# Patient Record
Sex: MALE | Race: OTHER | Employment: FULL TIME | URBAN - METROPOLITAN AREA
[De-identification: names, ages, dates, MRNs, and addresses within clinical notes are randomized per-mention and may not be internally consistent; named-entity substitution may affect disease eponyms.]

---

## 2018-10-29 ENCOUNTER — HOSPITAL ENCOUNTER (EMERGENCY)
Age: 38
Discharge: HOME | End: 2018-10-30
Attending: STUDENT IN AN ORGANIZED HEALTH CARE EDUCATION/TRAINING PROGRAM
Payer: SELF-PAY

## 2018-10-29 DIAGNOSIS — E86.0 DEHYDRATION: ICD-10-CM

## 2018-10-29 DIAGNOSIS — R07.9 CHEST PAIN, UNSPECIFIED TYPE: Primary | ICD-10-CM

## 2018-10-29 LAB
BASOPHILS # BLD: 0 K/UL (ref 0–0.1)
BASOPHILS NFR BLD: 1 % (ref 0–1)
COMMENT, HOLDF: NORMAL
DIFFERENTIAL METHOD BLD: ABNORMAL
EOSINOPHIL # BLD: 0.5 K/UL (ref 0–0.4)
EOSINOPHIL NFR BLD: 7 % (ref 0–7)
ERYTHROCYTE [DISTWIDTH] IN BLOOD BY AUTOMATED COUNT: 12.2 % (ref 11.5–14.5)
HCT VFR BLD AUTO: 39 % (ref 36.6–50.3)
HGB BLD-MCNC: 13.1 G/DL (ref 12.1–17)
IMM GRANULOCYTES # BLD: 0.1 K/UL (ref 0–0.04)
IMM GRANULOCYTES NFR BLD AUTO: 1 % (ref 0–0.5)
LYMPHOCYTES # BLD: 2.2 K/UL (ref 0.8–3.5)
LYMPHOCYTES NFR BLD: 30 % (ref 12–49)
MCH RBC QN AUTO: 32.3 PG (ref 26–34)
MCHC RBC AUTO-ENTMCNC: 33.6 G/DL (ref 30–36.5)
MCV RBC AUTO: 96.3 FL (ref 80–99)
MONOCYTES # BLD: 0.7 K/UL (ref 0–1)
MONOCYTES NFR BLD: 9 % (ref 5–13)
NEUTS SEG # BLD: 3.8 K/UL (ref 1.8–8)
NEUTS SEG NFR BLD: 53 % (ref 32–75)
NRBC # BLD: 0 K/UL (ref 0–0.01)
NRBC BLD-RTO: 0 PER 100 WBC
PLATELET # BLD AUTO: 230 K/UL (ref 150–400)
PMV BLD AUTO: 10.3 FL (ref 8.9–12.9)
RBC # BLD AUTO: 4.05 M/UL (ref 4.1–5.7)
SAMPLES BEING HELD,HOLD: NORMAL
WBC # BLD AUTO: 7.2 K/UL (ref 4.1–11.1)

## 2018-10-29 PROCEDURE — 36415 COLL VENOUS BLD VENIPUNCTURE: CPT | Performed by: STUDENT IN AN ORGANIZED HEALTH CARE EDUCATION/TRAINING PROGRAM

## 2018-10-29 PROCEDURE — 84484 ASSAY OF TROPONIN QUANT: CPT | Performed by: STUDENT IN AN ORGANIZED HEALTH CARE EDUCATION/TRAINING PROGRAM

## 2018-10-29 PROCEDURE — 93005 ELECTROCARDIOGRAM TRACING: CPT

## 2018-10-29 PROCEDURE — 85025 COMPLETE CBC W/AUTO DIFF WBC: CPT | Performed by: STUDENT IN AN ORGANIZED HEALTH CARE EDUCATION/TRAINING PROGRAM

## 2018-10-29 PROCEDURE — 80048 BASIC METABOLIC PNL TOTAL CA: CPT | Performed by: STUDENT IN AN ORGANIZED HEALTH CARE EDUCATION/TRAINING PROGRAM

## 2018-10-29 PROCEDURE — 99284 EMERGENCY DEPT VISIT MOD MDM: CPT

## 2018-10-30 ENCOUNTER — APPOINTMENT (OUTPATIENT)
Dept: GENERAL RADIOLOGY | Age: 38
End: 2018-10-30
Attending: STUDENT IN AN ORGANIZED HEALTH CARE EDUCATION/TRAINING PROGRAM
Payer: SELF-PAY

## 2018-10-30 VITALS
TEMPERATURE: 98.1 F | HEART RATE: 56 BPM | RESPIRATION RATE: 15 BRPM | DIASTOLIC BLOOD PRESSURE: 86 MMHG | SYSTOLIC BLOOD PRESSURE: 134 MMHG | OXYGEN SATURATION: 96 %

## 2018-10-30 LAB
ANION GAP SERPL CALC-SCNC: 6 MMOL/L (ref 5–15)
ATRIAL RATE: 61 BPM
BUN SERPL-MCNC: 24 MG/DL (ref 6–20)
BUN/CREAT SERPL: 16 (ref 12–20)
CALCIUM SERPL-MCNC: 8.8 MG/DL (ref 8.5–10.1)
CALCULATED P AXIS, ECG09: 38 DEGREES
CALCULATED R AXIS, ECG10: 80 DEGREES
CALCULATED T AXIS, ECG11: 48 DEGREES
CHLORIDE SERPL-SCNC: 106 MMOL/L (ref 97–108)
CO2 SERPL-SCNC: 28 MMOL/L (ref 21–32)
CREAT SERPL-MCNC: 1.48 MG/DL (ref 0.7–1.3)
DIAGNOSIS, 93000: NORMAL
GLUCOSE SERPL-MCNC: 120 MG/DL (ref 65–100)
P-R INTERVAL, ECG05: 162 MS
POTASSIUM SERPL-SCNC: 4.1 MMOL/L (ref 3.5–5.1)
Q-T INTERVAL, ECG07: 428 MS
QRS DURATION, ECG06: 88 MS
QTC CALCULATION (BEZET), ECG08: 430 MS
SODIUM SERPL-SCNC: 140 MMOL/L (ref 136–145)
TROPONIN I SERPL-MCNC: <0.05 NG/ML
VENTRICULAR RATE, ECG03: 61 BPM

## 2018-10-30 PROCEDURE — 71046 X-RAY EXAM CHEST 2 VIEWS: CPT

## 2018-10-30 NOTE — ED NOTES
Patient provided with paperwork and follow up instructions. PIV discontinued. Ambulatory with steady gait out of department accompanied by spouse.

## 2018-10-30 NOTE — ED TRIAGE NOTES
Pt arrives ambulatory to ED with complaints of palpitations x 3 days. Pt denies any cardiac history.

## 2018-10-30 NOTE — DISCHARGE INSTRUCTIONS
Sincere no peito: Instruções de cuidados - [ Chest Pain: Care Instructions ]  Suas instruções de cuidados  Muitas coisas podem causar sincere no peito. Às vezes não é nada grave, e a sincere vai melhorando por conta própria em alguns iyer. Mas alguns tipos de dores no peito demandam mais exames e tratamento. Seu médico pode ter recomendado nakia visita para acompanhamento jeannette próximas 8 a 12 horas. Se você não estiver melhorando, pode ser necessário fazer mais exames e tratamento. Mesmo que tenha sido liberado pelo médico, você ainda precisa vigiar o surgimento de problemas. O médico consultou você atentamente, mas às vezes os problemas podem surgir mais tarde. Se apresentar sintomas novos, ou se seus sintomas não melhorarem, procure atendimento médico imediatamente. Se sua sincere no peito piorar ou ficar diferente, se a pressão durar mais de 5 minutos ou se você desmaiar (perder consciência), ligue para a emergência ou procure atendimento imediatamente. Nakia visita médica é apenas nakia etapa de seu tratamento. Mesmo que se sinta melhor, você ainda precisará o que o médico recomendar, jan ir a todas as consultas agendadas e sharri os medicamentos conforme jeet prescritos. Violetta Erichsen a se recuperar e prevenir problemas futuros. [place] cuidar de si mesmo em casa? · Descanse até se sentir melhor. · [place] seus medicamentos exatamente jan prescritos. Fale com seu médico se achar que está tendo algum problema com o medicamento. · Não dirija após sharri um medicamento para sincere sujeito a receita médica. Quando você deve pedir ajuda? Ligue para a emergência se:  · Você desmaiar (perda de consciência). · Você tiver muita dificuldade para respirar. · Você apresentar sintomas de um ataque cardíaco. Isso pode incluir:  ? Pressão ou sincere no peito, ou nakia sensação estranha no local.  ? Transpiração. ? Falta de ar.  ? Náusea ou vômito.   ? Sincere, pressão ou sensação estranha jeannette tasha, no pescoço, no maxilar, na parte superior da rigo, ou em um ou ambos os braços ou ombros.  ? Distração ou fraqueza repentina. ? Batimento cardíaco irregular ou acelerado. Após ligar para a emergência, o operador poderá pedir que você mastigue 1 aspirina para adultos, ou 2 a 4 de dose mais baixa. Espere a ambulância. Não tente dirigir até o hospital.  Ligue para o médico hoje mesmo se:  · Tiver alguma dificuldade em respirar. · A donell no peito piorar. · Tiver tonturas ou vertigens ou se sentir que pode desmaiar. · Você não estiver ficando melhor, conforme esperado. · Você apresentar nakia donell no peito nova ou diferente. Onde você pode aprender mais? Acesse http://www.[employer]/. Digite o A120 Na caixa de busca para saber mais sobre \"Donell no peito: Instruções de cuidados - [ Chest Pain: Care Instructions ]. \"  Na data de: 20 novembro, 2017  Versão de conteúdo: 11.8  © 7180-9301 [employer]. Instruções de cuidados adaptadas sob licença de seu profissional da saúde. Se você tem dúvidas sobre um estado clínico ou essas instruções, sempre pergunte ao seu profissional da saúde. A [employer] renúncia a toda e qualquer garantia ou responsabilidade por seu uso dessas informações.

## 2018-10-30 NOTE — ED PROVIDER NOTES
Mr. Sourav Leal is a 44 y/o male presenting to the ED for chest pain and palpitations. Pt is here with his wife who is interpreting for him (speaks [place]). Pt is otherwise healthy without no known health problems, takes no medications, doesn't smoke, use recreational drugs, drinks aprox 5 beers daily. Pt has been having intermittent palpitations for last week and mild chest discomfort that he describes as stabbing in left side of chest.  He denies any recent travel, denies hx of blood clots, recent illness, sick contacts. History reviewed. No pertinent past medical history. History reviewed. No pertinent surgical history. History reviewed. No pertinent family history. Social History Socioeconomic History  Marital status:  Spouse name: Not on file  Number of children: Not on file  Years of education: Not on file  Highest education level: Not on file Social Needs  Financial resource strain: Not on file  Food insecurity - worry: Not on file  Food insecurity - inability: Not on file  Transportation needs - medical: Not on file  Transportation needs - non-medical: Not on file Occupational History  Not on file Tobacco Use  Smoking status: Never Smoker  Smokeless tobacco: Never Used Substance and Sexual Activity  Alcohol use: Yes Comment: 4-5 beers a day  Drug use: No  
 Sexual activity: Not on file Other Topics Concern  Not on file Social History Narrative  Not on file ALLERGIES: Patient has no known allergies. Review of Systems Constitutional: Negative for chills, diaphoresis, fatigue and fever. HENT: Negative for congestion, rhinorrhea, sinus pressure, sore throat, trouble swallowing and voice change. Eyes: Negative for photophobia and visual disturbance. Respiratory: Negative for cough, chest tightness and shortness of breath. Cardiovascular: Positive for chest pain and palpitations. Negative for leg swelling. Gastrointestinal: Negative for abdominal pain, blood in stool, constipation, diarrhea, nausea and vomiting. Musculoskeletal: Negative for arthralgias, myalgias and neck pain. Neurological: Negative for dizziness, weakness, light-headedness, numbness and headaches. All other systems reviewed and are negative. Vitals:  
 10/29/18 2326 10/29/18 2329 10/30/18 0000 10/30/18 0018 BP: (!) 151/98  136/88 134/86 Pulse: 61  (!) 57 (!) 56 Resp: 16  16 15 Temp:  98 °F (36.7 °C)  98.1 °F (36.7 °C) SpO2: 98%  97% 96% Physical Exam  
Constitutional: He is oriented to person, place, and time. He appears well-developed and well-nourished. No distress. HENT:  
Head: Normocephalic and atraumatic. Nose: Nose normal.  
Mouth/Throat: Oropharynx is clear and moist. No oropharyngeal exudate. Eyes: Conjunctivae and EOM are normal. Pupils are equal, round, and reactive to light. Right eye exhibits no discharge. Left eye exhibits no discharge. No scleral icterus. Neck: Normal range of motion. Neck supple. No JVD present. No tracheal deviation present. No thyromegaly present. Cardiovascular: Normal rate, regular rhythm, normal heart sounds and intact distal pulses. Exam reveals no gallop and no friction rub. No murmur heard. Pulmonary/Chest: Effort normal and breath sounds normal. No stridor. No respiratory distress. He has no wheezes. He has no rales. He exhibits no tenderness. Abdominal: Bowel sounds are normal. He exhibits no distension and no mass. There is no tenderness. There is no rebound. Musculoskeletal: Normal range of motion. He exhibits no edema or tenderness. Lymphadenopathy:  
  He has no cervical adenopathy. Neurological: He is alert and oriented to person, place, and time. No cranial nerve deficit. Coordination normal.  
Skin: Skin is warm and dry. No rash noted. He is not diaphoretic.  No erythema. No pallor. Psychiatric: He has a normal mood and affect. His behavior is normal. Judgment and thought content normal.  
Nursing note and vitals reviewed. MDM Number of Diagnoses or Management Options Chest pain, unspecified type:  
Dehydration:  
  
Amount and/or Complexity of Data Reviewed Clinical lab tests: ordered and reviewed Tests in the radiology section of CPT®: reviewed and ordered Independent visualization of images, tracings, or specimens: yes Risk of Complications, Morbidity, and/or Mortality Presenting problems: moderate Diagnostic procedures: moderate Management options: moderate Patient Progress Patient progress: resolved Procedures 5:00 AM 
The patient has been reevaluated. The patient is ready for discharge. The patient's signs, symptoms, diagnosis, and discharge instructions have been discussed and the patient/ family has conveyed their understanding. The patient is to follow up as recommended or return to the ED should their symptoms worsen. Plan has been discussed and the patient is in agreement. LABORATORY TESTS: 
Recent Results (from the past 12 hour(s)) EKG, 12 LEAD, INITIAL Collection Time: 10/29/18 11:28 PM  
Result Value Ref Range Ventricular Rate 61 BPM  
 Atrial Rate 61 BPM  
 P-R Interval 162 ms QRS Duration 88 ms Q-T Interval 428 ms QTC Calculation (Bezet) 430 ms Calculated P Axis 38 degrees Calculated R Axis 80 degrees Calculated T Axis 48 degrees Diagnosis Normal sinus rhythm Normal ECG No previous ECGs available SAMPLES BEING HELD Collection Time: 10/29/18 11:37 PM  
Result Value Ref Range SAMPLES BEING HELD 1 LAV 1BLUE 1GREEN   
 COMMENT Add-on orders for these samples will be processed based on acceptable specimen integrity and analyte stability, which may vary by analyte. CBC WITH AUTOMATED DIFF Collection Time: 10/29/18 11:37 PM  
Result Value Ref Range WBC 7.2 4.1 - 11.1 K/uL  
 RBC 4.05 (L) 4.10 - 5.70 M/uL  
 HGB 13.1 12.1 - 17.0 g/dL HCT 39.0 36.6 - 50.3 % MCV 96.3 80.0 - 99.0 FL  
 MCH 32.3 26.0 - 34.0 PG  
 MCHC 33.6 30.0 - 36.5 g/dL  
 RDW 12.2 11.5 - 14.5 % PLATELET 116 499 - 010 K/uL MPV 10.3 8.9 - 12.9 FL  
 NRBC 0.0 0  WBC ABSOLUTE NRBC 0.00 0.00 - 0.01 K/uL NEUTROPHILS 53 32 - 75 % LYMPHOCYTES 30 12 - 49 % MONOCYTES 9 5 - 13 % EOSINOPHILS 7 0 - 7 % BASOPHILS 1 0 - 1 % IMMATURE GRANULOCYTES 1 (H) 0.0 - 0.5 % ABS. NEUTROPHILS 3.8 1.8 - 8.0 K/UL  
 ABS. LYMPHOCYTES 2.2 0.8 - 3.5 K/UL  
 ABS. MONOCYTES 0.7 0.0 - 1.0 K/UL  
 ABS. EOSINOPHILS 0.5 (H) 0.0 - 0.4 K/UL  
 ABS. BASOPHILS 0.0 0.0 - 0.1 K/UL  
 ABS. IMM. GRANS. 0.1 (H) 0.00 - 0.04 K/UL  
 DF AUTOMATED METABOLIC PANEL, BASIC Collection Time: 10/29/18 11:37 PM  
Result Value Ref Range Sodium 140 136 - 145 mmol/L Potassium 4.1 3.5 - 5.1 mmol/L Chloride 106 97 - 108 mmol/L  
 CO2 28 21 - 32 mmol/L Anion gap 6 5 - 15 mmol/L Glucose 120 (H) 65 - 100 mg/dL BUN 24 (H) 6 - 20 MG/DL Creatinine 1.48 (H) 0.70 - 1.30 MG/DL  
 BUN/Creatinine ratio 16 12 - 20 GFR est AA >60 >60 ml/min/1.73m2 GFR est non-AA 53 (L) >60 ml/min/1.73m2 Calcium 8.8 8.5 - 10.1 MG/DL  
TROPONIN I Collection Time: 10/29/18 11:37 PM  
Result Value Ref Range Troponin-I, Qt. <0.05 <0.05 ng/mL IMAGING RESULTS: 
XR CHEST PA LAT Final Result Xr Chest Pa Lat Result Date: 10/30/2018 History: Evaluate for chest pain. Frontal and lateral views of the chest show clear lungs. The heart, mediastinum and pulmonary vasculature are normal.  The bony thorax is unremarkable. IMPRESSION: NORMAL CHEST. MEDICATIONS GIVEN: 
Medications - No data to display IMPRESSION: 
1. Chest pain, unspecified type 2. Dehydration PLAN: 
1. There are no discharge medications for this patient. 2.  
Follow-up Information Follow up With Specialties Details Why Contact Info [facility]  If symptoms worsen [address], [address] [facility] [address] [phone] Return to ED for new or worsening symptoms Ally Stoddard MD